# Patient Record
Sex: MALE | Race: WHITE | NOT HISPANIC OR LATINO | Employment: FULL TIME | URBAN - METROPOLITAN AREA
[De-identification: names, ages, dates, MRNs, and addresses within clinical notes are randomized per-mention and may not be internally consistent; named-entity substitution may affect disease eponyms.]

---

## 2023-12-23 ENCOUNTER — OFFICE VISIT (OUTPATIENT)
Dept: URGENT CARE | Facility: MEDICAL CENTER | Age: 38
End: 2023-12-23
Payer: COMMERCIAL

## 2023-12-23 VITALS
HEART RATE: 91 BPM | SYSTOLIC BLOOD PRESSURE: 142 MMHG | DIASTOLIC BLOOD PRESSURE: 88 MMHG | RESPIRATION RATE: 18 BRPM | TEMPERATURE: 99.1 F | OXYGEN SATURATION: 99 %

## 2023-12-23 DIAGNOSIS — K04.7 DENTAL ABSCESS: Primary | ICD-10-CM

## 2023-12-23 PROCEDURE — 99213 OFFICE O/P EST LOW 20 MIN: CPT | Performed by: PHYSICIAN ASSISTANT

## 2023-12-23 RX ORDER — CLINDAMYCIN HYDROCHLORIDE 300 MG/1
300 CAPSULE ORAL 3 TIMES DAILY
Qty: 21 CAPSULE | Refills: 0 | Status: SHIPPED | OUTPATIENT
Start: 2023-12-23 | End: 2023-12-30

## 2023-12-23 NOTE — PROGRESS NOTES
[facility]    NAME: Oziel Barraza is a 38 y.o. male  : 1985    MRN: [id number]  DATE: 2023  TIME: 10:16 AM    Assessment and Plan   Dental abscess [K04.7]  1. Dental abscess  clindamycin (CLEOCIN) 300 MG capsule        Patient informed that if significant worsening of symptoms to proceed to emergency room.  He expressed understanding.  Patient Instructions     Patient Instructions   Take antibiotic as instructed.  Follow up with dentist as soon as possible.  Dental Abscess   AMBULATORY CARE:   A dental abscess  is a collection of pus in or around a tooth. A dental abscess is caused by bacteria. The bacteria can enter the tooth when the enamel (outer part of the tooth) is damaged by tooth decay. Bacteria can also enter the tooth through a chip in the tooth or a cut in the gum. Food particles that are stuck between the teeth for a long time may also lead to an abscess.        Common signs and symptoms:   Toothache, a loose tooth, or a tooth that is very sensitive to pressure or temperature    Bad breath, unpleasant taste, and drooling    Fever    Pain, redness, and swelling of the gums, or swelling of your face and neck    Pain when you open or close your mouth    Trouble opening your mouth    Seek care immediately if:   You have severe pain in your tooth or jaw.    You have trouble breathing because of pain or swelling.    Call your doctor if:   Your symptoms get worse, even after treatment.    Your mouth is bleeding.    You cannot eat or drink because of pain or swelling.    Your abscess returns.    You have an injury that causes a crack in your tooth.    You have questions or concerns about your condition or care.    Treatment:  You may  need any of the following:  Medicines  may be given to treat a bacterial infection and decrease pain.     Incision and drainage  is a cut in the abscess to allow the pus to drain. A sample of fluid may be collected from your abscess. The fluid is sent  to a lab and tested for bacteria. Ask your healthcare provider for more information.    A root canal  is a procedure to remove the bacteria and prevent more infection. It is usually done after an incision and drainage. A filling or crown will be placed over the tooth after you have healed from your root canal.     Tooth removal  may be needed if the infection affects deeper tissues. This is usually done after an incision and drainage.    Self-care:   Rinse your mouth every 2 hours with salt water.  This will help keep the area clean.     Gently brush your teeth twice a day with a soft tooth brush.  This will help keep the area clean.     Eat soft foods as directed.  Soft foods may cause less pain. Examples include applesauce, yogurt, and cooked pasta. Ask your healthcare provider how long to follow this instruction.     Apply a warm compress to your tooth or gum.  Use a cotton ball or gauze soaked in warm water. Remove the compress in 10 minutes or when it becomes cool. Repeat 3 times a day.    Prevent another abscess:   Brush your teeth at least 2 times a day  with fluoride toothpaste.    Use dental floss at least once a day  to clean between your teeth.    Rinse your mouth with water or mouthwash  after meals and snacks. Chew sugarless gum.    Avoid sugary and starchy food that can stick between your teeth.  Limit drinks high in sugar, such as soda or fruit juice.    See your dentist every 6 months  for dental cleanings and oral exams.    Follow up with your doctor or dentist as directed:  Your healthcare provider will need to check your teeth and gums. Write down your questions so you remember to ask them during your visits.   © Copyright [employer] 2023 Information is for End User's use only and may not be sold, redistributed or otherwise used for commercial purposes.  The above information is an  only. It is not intended as medical advice for individual conditions or treatments. Talk to your  doctor, nurse or pharmacist before following any medical regimen to see if it is safe and effective for you.      Chief Complaint     Chief Complaint   Patient presents with    Facial Swelling     Patient c/o left side facial swelling.       History of Present Illness   Oziel Barraza presents to the clinic c/o  Pt comes in with complaint of: Swelling pain left side of face.    Started: Over a week ago he had a cold.  Says he has history of terrible teeth and sometimes cold will get into his dental cavities and cause infection.  Associated symptoms: Pain, swelling, difficult time opening his mouth.  No fever or chills.  Modifying factors: Had some leftover clindamycin that he started yesterday.  Does not have anymore.  Has history of terrible teeth and needs to get them all taken out.  Smoker: Smoker.        Review of Systems   Review of Systems   Constitutional: Negative.    HENT:  Positive for dental problem and facial swelling.    Respiratory:  Negative for shortness of breath.        Current Medications     Long-Term Medications   Medication Sig Dispense Refill    ibuprofen (ADVIL,MOTRIN) 100 MG tablet Take 100 mg by mouth every 6 (six) hours as needed         Current Allergies     Allergies as of 12/23/2023 - Reviewed 12/23/2023   Allergen Reaction Noted    Codeine GI Intolerance 12/23/2023    Penicillins GI Intolerance 12/23/2023          The following portions of the patient's history were reviewed and updated as appropriate: allergies, current medications, past family history, past medical history, past social history, past surgical history and problem list.  History reviewed. No pertinent past medical history.  History reviewed. No pertinent surgical history.  History reviewed. No pertinent family history.    Objective   /88   Pulse 91   Temp 99.1 °F (37.3 °C)   Resp 18   SpO2 99%   No LMP for male patient.       Physical Exam     Physical Exam  Vitals and nursing note reviewed.   Constitutional:        General: He is not in acute distress.     Appearance: He is well-developed. He is not ill-appearing, toxic-appearing or diaphoretic.   HENT:      Head: Atraumatic.      Comments: Swelling left side of face with tenderness to palpation mandible region.  No redness or induration.     Mouth/Throat:      Mouth: Mucous membranes are moist.      Dentition: Abnormal dentition. Dental tenderness, gingival swelling and dental caries present.      Pharynx: Oropharynx is clear. Uvula midline. No uvula swelling.      Comments: Multiple broken teeth with dental caries.  Tenderness to palpation along left upper gumline.  Cardiovascular:      Rate and Rhythm: Normal rate and regular rhythm.   Pulmonary:      Effort: Pulmonary effort is normal.   Skin:     General: Skin is warm and dry.   Neurological:      Mental Status: He is alert and oriented to person, place, and time.

## 2023-12-23 NOTE — PATIENT INSTRUCTIONS
Take antibiotic as instructed.  Follow up with dentist as soon as possible.  Dental Abscess   AMBULATORY CARE:   A dental abscess  is a collection of pus in or around a tooth. A dental abscess is caused by bacteria. The bacteria can enter the tooth when the enamel (outer part of the tooth) is damaged by tooth decay. Bacteria can also enter the tooth through a chip in the tooth or a cut in the gum. Food particles that are stuck between the teeth for a long time may also lead to an abscess.        Common signs and symptoms:   Toothache, a loose tooth, or a tooth that is very sensitive to pressure or temperature    Bad breath, unpleasant taste, and drooling    Fever    Pain, redness, and swelling of the gums, or swelling of your face and neck    Pain when you open or close your mouth    Trouble opening your mouth    Seek care immediately if:   You have severe pain in your tooth or jaw.    You have trouble breathing because of pain or swelling.    Call your doctor if:   Your symptoms get worse, even after treatment.    Your mouth is bleeding.    You cannot eat or drink because of pain or swelling.    Your abscess returns.    You have an injury that causes a crack in your tooth.    You have questions or concerns about your condition or care.    Treatment:  You may  need any of the following:  Medicines  may be given to treat a bacterial infection and decrease pain.     Incision and drainage  is a cut in the abscess to allow the pus to drain. A sample of fluid may be collected from your abscess. The fluid is sent to a lab and tested for bacteria. Ask your healthcare provider for more information.    A root canal  is a procedure to remove the bacteria and prevent more infection. It is usually done after an incision and drainage. A filling or crown will be placed over the tooth after you have healed from your root canal.     Tooth removal  may be needed if the infection affects deeper tissues. This is usually done after an  incision and drainage.    Self-care:   Rinse your mouth every 2 hours with salt water.  This will help keep the area clean.     Gently brush your teeth twice a day with a soft tooth brush.  This will help keep the area clean.     Eat soft foods as directed.  Soft foods may cause less pain. Examples include applesauce, yogurt, and cooked pasta. Ask your healthcare provider how long to follow this instruction.     Apply a warm compress to your tooth or gum.  Use a cotton ball or gauze soaked in warm water. Remove the compress in 10 minutes or when it becomes cool. Repeat 3 times a day.    Prevent another abscess:   Brush your teeth at least 2 times a day  with fluoride toothpaste.    Use dental floss at least once a day  to clean between your teeth.    Rinse your mouth with water or mouthwash  after meals and snacks. Chew sugarless gum.    Avoid sugary and starchy food that can stick between your teeth.  Limit drinks high in sugar, such as soda or fruit juice.    See your dentist every 6 months  for dental cleanings and oral exams.    Follow up with your doctor or dentist as directed:  Your healthcare provider will need to check your teeth and gums. Write down your questions so you remember to ask them during your visits.   © Copyright [employer] 2023 Information is for End User's use only and may not be sold, redistributed or otherwise used for commercial purposes.  The above information is an  only. It is not intended as medical advice for individual conditions or treatments. Talk to your doctor, nurse or pharmacist before following any medical regimen to see if it is safe and effective for you.